# Patient Record
Sex: FEMALE | Race: WHITE
[De-identification: names, ages, dates, MRNs, and addresses within clinical notes are randomized per-mention and may not be internally consistent; named-entity substitution may affect disease eponyms.]

---

## 2018-05-17 NOTE — NUR
PATIENT REMAINS ALERT AND ORIENTED THROUGHOUT SHIFT. VITAL SIGNS STABLE ON
ROOM AIR. IV PATENT IN THE LEFT AC INFUSING AT 80 ML/HR. DENIES PAIN AND
NAUSEA. ASSISTED STANDBY WITH WALKER AND GAITBELT TO THE RESTROOM. MAINTAINED
NPO STATUS. REPOSITIONING SELF IN BED. HOURLY ROUNDING COMPLETE. NURSING WILL
CONTINUE TO MONITOR.

## 2018-05-17 NOTE — NUR
I ASSUMED CARE OF THE PATIENT AT 0700.  SHE IS ALERT AND ORIENTED X4 AND IS UP
X1 WITH A WALKER AND A GAITBELT.  HOURLY ROUNDING WAS COMPLETED AND PATIENT
NEEDS ARE MET.  PAIN IS DENIED.  NEW IV WAS PLACED IN THE LEFT FOREARM.  XRAY
SHOWED IMPROVEMENT OF THE SMALL BOWEL OBSTRUCTION.  BED IS IN THE LOW LOCKED
POSITION AND CALL LIGHT IS IN REACH.  FAMILY AT THE BEDSIDE MOST OF THE DAY.
WILL CONTINUE TO MONITOR.  PATIENT IS ALLOWED TO HAVE SIPS TONIGHT AND WILL
POSSIBLY BE UPGRADED TO CLEAR LIQUIDS TOMORROW.

## 2018-05-17 NOTE — NUR
PT.HAD JUST GOTTEN BACK FROM WALKING TO BATHROOM WITH ASSIST OF CNA. GAIT
STEADY. SHE SAID SHE LIVES ALONE. DAUGHTER LIVES IN THE SAME APT.COMPLEX AND
CHECKS ON HER OFTEN. SHE IS NORMALLY INDEPENNDENT AT HOME. HAS HAD HOME HEALTH
IN THE PAST. CANNOT REMEMBER THE NAME OF AGENCY.  DOES NOT FEEL SHE WILL HAVE
ANY DISCHARGE NEEDS. CM WILL FOLLOW.

## 2018-05-18 NOTE — NUR
PATIENT ALERT AND ORIENTED X4 THROUGHOUT SHIFT. VITAL SIGNS STABLE ON ROOM
AIR. MEDICATIONS GIVEN AS ORDERED. ASSISTED STANDBY TO THE BATHROOM WITH A
GAITBELT AND WALKER.  DENIES PAIN AND OR NAUSEA. RESTLESS THROUGHOUT NIGHT. IV
PATENT IN THE LEFT FOREARM INFUSING AT 80 ML/HR. WORKING WITH PT/OT ON DAY
SHIFT. TOLERATING CLEAR LIQUID DIET. REPOSITIONING SELF IN BED. HOURLY
ROUNDING COMPLETE. BED IN LOW POSITION, BED ALARM IN PLACE. CALL LIGHT WITHIN
REACH. NURSING WILL CONTINUE TO MONITOR.

## 2018-05-18 NOTE — NUR
PT. SAYS THAT SHE COMPLETED ADLS ON HER OWN INDEPENDENTLY THIS MORNING.  HER
FAMILY MEMBER AND NURSING STAFF AGREE PT. IS INDEPENDENT WITH ADLS.  THUS, PT.
HAS MET HER GOALS FOR O.T. AND WILL BE DISCHARGED FROM O.T. SERVICES.

## 2018-05-19 NOTE — NUR
PATIENT ALERT AND ORIENTED X 4. VITAL SIGNS STABLE ON ROOM AIR. PERRLA. UP AD
RHETT IN ROOM AND AMBULATES IN HALLWAY. IV PATENT WITH FLUIDS INFUSING. DENIES
PAIN AND NAUSEA. HOURLY ROUNDS MAINTAINED THROUGHOUT THE SHIFT. CALL LIGHT
WITHIN REACH. NURSING WILL CONTINUE TO MONITOR.

## 2018-05-19 NOTE — NUR
PATIENT ALERT AND ORIENTED X 4. VITALS STABLE. RA. DULCOLAX SUPPOSITORY GIVEN
AT HS PER ORDER. PATIENT REPORTS ONE FORMED BOWEL MOVEMENT AND MULTIPLE LOOSE
STOOLS THROUGHT THE NIGHT. REPORTS MILD ABDOMINAL PAIN. DENIES NAUSEA. FLUIDS
INFUSING PER ORDER. UP INDEPENDENTLY. HOURLY ROUNDS. NURSING WILL CONTINUE TO
MONITOR.

## 2018-05-20 NOTE — NUR
PATIENT HAS SLEPT WELL THROUGHOUT THE NIGHT WITHOUT ANY ISSUES. VSS ON RA.
PATIENT IS UP AD-RHETT AND STEADY. NO C/O PAIN. NO C/O NAUSEA. IV IN LEFT
FOREARM-SL. PATIENT INSTRUCTED TO USE CALL LIGHT WHEN NEEDING ASSISTANCE.
HOURLY ROUNDS MADE. WILL CONTINUE WITH PLAN OF CARE AND NURSING TO MONITOR.

## 2018-05-20 NOTE — NUR
PATIENT ALERT AND ORIENTED X 4. VITAL SIGNS STABLE ON ROOM AIR. AFEBRILE.
PERRLA. UP AD RHETT IN ROOM AND AMBULATING IN HALLWAY. IV PATENT AND SALINE
LOCKED. DENIES PAIN AND NAUSEA. REFUSES SCD'S. EDUCATED ON THE IMPORTANCE.
HOURLY ROUNDS MAINTAINED THROUGHOUT THE SHIFT.  CALL LIGHT WITHIN REACH.
NURSING WILL CONTINUE TO MONITOR.

## 2018-05-21 NOTE — NUR
ASSUMED CARES OF PT AT 0700. PT IN BED, BED IN LOW LOCKED POSITION, CALL
BUTTON AND PERSONAL ITEMS IN PT REACH. PT A&O X4, HRRR PER AUSCULTATION,
LCTAB. VSS ON RA, AFEBRILE, PERRLA, PLEASANT, TALKATIVE, DAUGHTER AT BEDSIDE.
PULSES RADIAL AND PEDAL WNL. SKIN INTACT WITH SCATTERED BRUISING AND SCARS. PT
UP INDEPENDENTLY TO BATHROOM/STEADY GAIT. LEFT FA IV PATENT TO FLUSH, SALINE
LOCKED. PAIN RATED 5/10 IN BACK, PAIN MEDS SOMEWHAT EFFECTIVE. PT CLEARED FOR
D/C. IV REMOVED, PT BELONGINGS PACKED AND TAKEN WITH DAUGHTER TO CAR.
DISCHARGE, STROKE EDUCATION COMPLETED AND SIGNED. PT SIGNED MEDICARE RIGHTS.
FOLLOW UP APPTS REVIEWED. ALL QUESTIONS ANSWERED. PT ESCORTED VIA W/C WITH
NURSING STAFF TO DAUGHTER IN CAR. DISCHARGING HOME, PT STABLE AND TALKATIVE AT
D/C. DISCHARGE COMPLETED AT 1400.

## 2018-05-21 NOTE — NUR
PATIENT HAS SLEPT WELL THROUGHOUT THE NIGHT WITHOUT ANY ISSUES. NO C/O PAIN
AND NO C/O NAUSEA. VSS ON RA. PATIENT UP AD-RHETT AND STEADY. PATIENT INSTRUCTED
TO USE CALL LIGHT WHEN NEEDING ASSISTANCE. HOURLY ROUNDS MADE. WILL CONTINUE
WITH PLAN OF CARE AND NURSING TO MONITOR.

## 2019-03-10 ENCOUNTER — HOSPITAL ENCOUNTER (EMERGENCY)
Dept: HOSPITAL 96 - M.ERS | Age: 84
Discharge: HOME | End: 2019-03-10
Payer: MEDICARE

## 2019-03-10 VITALS — HEIGHT: 62 IN | WEIGHT: 135.01 LBS | BODY MASS INDEX: 24.84 KG/M2

## 2019-03-10 VITALS — DIASTOLIC BLOOD PRESSURE: 73 MMHG | SYSTOLIC BLOOD PRESSURE: 179 MMHG

## 2019-03-10 DIAGNOSIS — S52.614A: ICD-10-CM

## 2019-03-10 DIAGNOSIS — Z88.5: ICD-10-CM
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
## 2019-03-10 DIAGNOSIS — Z85.89: ICD-10-CM

## 2019-03-10 DIAGNOSIS — Z96.653: ICD-10-CM

## 2019-03-10 DIAGNOSIS — S52.591A: Primary | ICD-10-CM
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
## 2019-03-10 DIAGNOSIS — Z90.710: ICD-10-CM

## 2019-03-10 DIAGNOSIS — Z88.8: ICD-10-CM

## 2019-03-10 DIAGNOSIS — Z88.0: ICD-10-CM

## 2019-03-10 DIAGNOSIS — Z88.2: ICD-10-CM

## 2019-03-10 DIAGNOSIS — E78.5: ICD-10-CM
